# Patient Record
Sex: FEMALE | Race: WHITE | Employment: FULL TIME | URBAN - METROPOLITAN AREA
[De-identification: names, ages, dates, MRNs, and addresses within clinical notes are randomized per-mention and may not be internally consistent; named-entity substitution may affect disease eponyms.]

---

## 2018-07-09 ENCOUNTER — TELEPHONE (OUTPATIENT)
Dept: ADMINISTRATIVE | Age: 53
End: 2018-07-09

## 2018-07-09 ENCOUNTER — OFFICE VISIT (OUTPATIENT)
Dept: ENT CLINIC | Age: 53
End: 2018-07-09
Payer: COMMERCIAL

## 2018-07-09 VITALS
HEART RATE: 73 BPM | DIASTOLIC BLOOD PRESSURE: 82 MMHG | TEMPERATURE: 97.8 F | HEIGHT: 66 IN | WEIGHT: 146.8 LBS | BODY MASS INDEX: 23.59 KG/M2 | SYSTOLIC BLOOD PRESSURE: 121 MMHG

## 2018-07-09 DIAGNOSIS — H60.332 ACUTE SWIMMER'S EAR OF LEFT SIDE: Primary | ICD-10-CM

## 2018-07-09 PROCEDURE — G8420 CALC BMI NORM PARAMETERS: HCPCS | Performed by: PHYSICIAN ASSISTANT

## 2018-07-09 PROCEDURE — 99203 OFFICE O/P NEW LOW 30 MIN: CPT | Performed by: PHYSICIAN ASSISTANT

## 2018-07-09 PROCEDURE — G8427 DOCREV CUR MEDS BY ELIG CLIN: HCPCS | Performed by: PHYSICIAN ASSISTANT

## 2018-07-09 PROCEDURE — 4130F TOPICAL PREP RX AOE: CPT | Performed by: PHYSICIAN ASSISTANT

## 2018-07-09 PROCEDURE — 3017F COLORECTAL CA SCREEN DOC REV: CPT | Performed by: PHYSICIAN ASSISTANT

## 2018-07-09 PROCEDURE — 4004F PT TOBACCO SCREEN RCVD TLK: CPT | Performed by: PHYSICIAN ASSISTANT

## 2018-07-09 RX ORDER — AMOXICILLIN 500 MG/1
500 CAPSULE ORAL 3 TIMES DAILY
COMMUNITY
End: 2019-05-07 | Stop reason: ALTCHOICE

## 2018-07-09 RX ORDER — LOSARTAN POTASSIUM 50 MG/1
50 TABLET ORAL DAILY
COMMUNITY

## 2018-07-09 RX ORDER — AMOXICILLIN AND CLAVULANATE POTASSIUM 875; 125 MG/1; MG/1
1 TABLET, FILM COATED ORAL 2 TIMES DAILY
Qty: 20 TABLET | Refills: 0 | Status: SHIPPED | OUTPATIENT
Start: 2018-07-09 | End: 2018-07-19

## 2018-07-09 RX ORDER — OFLOXACIN 3 MG/ML
5 SOLUTION AURICULAR (OTIC) 2 TIMES DAILY
COMMUNITY
End: 2019-05-07

## 2018-07-09 RX ORDER — FLUCONAZOLE 150 MG/1
150 TABLET ORAL ONCE
Qty: 2 TABLET | Refills: 0 | Status: SHIPPED | OUTPATIENT
Start: 2018-07-09 | End: 2018-07-09

## 2018-07-09 RX ORDER — CIPROFLOXACIN AND DEXAMETHASONE 3; 1 MG/ML; MG/ML
4 SUSPENSION/ DROPS AURICULAR (OTIC) 2 TIMES DAILY
Qty: 1 BOTTLE | Refills: 0 | Status: SHIPPED | OUTPATIENT
Start: 2018-07-09 | End: 2018-07-16

## 2018-07-09 RX ORDER — METHYLPREDNISOLONE 4 MG/1
TABLET ORAL
Qty: 1 KIT | Refills: 0 | Status: SHIPPED | OUTPATIENT
Start: 2018-07-09 | End: 2018-07-15

## 2018-07-09 RX ORDER — SIMVASTATIN 10 MG
10 TABLET ORAL DAILY
COMMUNITY

## 2018-07-09 ASSESSMENT — ENCOUNTER SYMPTOMS
VOMITING: 0
STRIDOR: 0
SHORTNESS OF BREATH: 0
NAUSEA: 0
GASTROINTESTINAL NEGATIVE: 1
EYES NEGATIVE: 1
RHINORRHEA: 0

## 2018-07-09 NOTE — TELEPHONE ENCOUNTER
Patient called and made a new patient appt for 7/26/18 with Sybil Bazan, She states that she's been in to see her PCP Dr. aCleb Quesada and he cleaned her ears out and now feels like she water stuck in there or swimmers ear. She states that her left ear is painful and has swelling. She is calling Dr. Caleb Quesada again to have him look at it but she said that what he is doing isn't helping her. I have her on the wait list and if there is anyway she would be able to be seen sooner to please call her at [phone], thank you.

## 2018-07-09 NOTE — PROGRESS NOTES
Positive for environmental allergies. Neurological: Negative. Negative for dizziness and headaches. /82 (Site: Right Arm, Position: Sitting, Cuff Size: Medium Adult)   Pulse 73   Temp 97.8 °F (36.6 °C) (Oral)   Ht 5' 6\" (1.676 m)   Wt 146 lb 12.8 oz (66.6 kg)   BMI 23.69 kg/m²   Physical Exam   Constitutional: She appears well-developed and well-nourished. HENT:   Head: Normocephalic and atraumatic. Right Ear: Tympanic membrane, external ear and ear canal normal. Tympanic membrane is not erythematous. Left Ear: There is drainage, swelling and tenderness. No mastoid tenderness. Nose: Mucosal edema present. No rhinorrhea. Mouth/Throat: Uvula is midline, oropharynx is clear and moist and mucous membranes are normal.   Severe swelling of the canal unable to visualize TM    No mastoid erythema or tenderness,  Displacement of the pinna, pain with palpation of the tragus, mild edema over TMJ radiating down mandible. No abscess noted within oral cavity- no acute abnormalities of teeth or gums noted. Eyes: Conjunctivae and EOM are normal. Pupils are equal, round, and reactive to light. Neck: Normal range of motion. Neck supple. No tracheal deviation present. No thyromegaly present. Cardiovascular: Normal rate. Pulmonary/Chest: Effort normal. No respiratory distress. Musculoskeletal: Normal range of motion. Neurological: She is alert. No cranial nerve deficit. Skin: Skin is warm. No erythema. Psychiatric: She has a normal mood and affect. Her behavior is normal. Thought content normal.   Vitals reviewed. DIAGNOSIS:    ICD-10-CM ICD-9-CM    1.  Acute swimmer's ear of left side H60.332 380.12 ciprofloxacin-dexamethasone (CIPRODEX) 0.3-0.1 % otic suspension      methylPREDNISolone (MEDROL DOSEPACK) 4 MG tablet      amoxicillin-clavulanate (AUGMENTIN) 875-125 MG per tablet      fluconazole (DIFLUCAN) 150 MG tablet       IMPRESSION/PLAN:  Wick placed in the office

## 2018-07-16 ENCOUNTER — OFFICE VISIT (OUTPATIENT)
Dept: ENT CLINIC | Age: 53
End: 2018-07-16
Payer: COMMERCIAL

## 2018-07-16 VITALS
DIASTOLIC BLOOD PRESSURE: 80 MMHG | SYSTOLIC BLOOD PRESSURE: 117 MMHG | BODY MASS INDEX: 23.25 KG/M2 | WEIGHT: 144.7 LBS | HEIGHT: 66 IN | HEART RATE: 93 BPM

## 2018-07-16 DIAGNOSIS — H65.33 BILATERAL CHRONIC SECRETORY OTITIS MEDIA: Primary | ICD-10-CM

## 2018-07-16 DIAGNOSIS — H60.331 ACUTE SWIMMER'S EAR OF RIGHT SIDE: ICD-10-CM

## 2018-07-16 DIAGNOSIS — H65.91 FLUID LEVEL BEHIND TYMPANIC MEMBRANE OF RIGHT EAR: ICD-10-CM

## 2018-07-16 PROCEDURE — G8427 DOCREV CUR MEDS BY ELIG CLIN: HCPCS | Performed by: PHYSICIAN ASSISTANT

## 2018-07-16 PROCEDURE — 3017F COLORECTAL CA SCREEN DOC REV: CPT | Performed by: PHYSICIAN ASSISTANT

## 2018-07-16 PROCEDURE — 99213 OFFICE O/P EST LOW 20 MIN: CPT | Performed by: PHYSICIAN ASSISTANT

## 2018-07-16 PROCEDURE — G8420 CALC BMI NORM PARAMETERS: HCPCS | Performed by: PHYSICIAN ASSISTANT

## 2018-07-16 PROCEDURE — 4130F TOPICAL PREP RX AOE: CPT | Performed by: PHYSICIAN ASSISTANT

## 2018-07-16 PROCEDURE — 4004F PT TOBACCO SCREEN RCVD TLK: CPT | Performed by: PHYSICIAN ASSISTANT

## 2018-07-16 ASSESSMENT — ENCOUNTER SYMPTOMS
NAUSEA: 0
GASTROINTESTINAL NEGATIVE: 1
SHORTNESS OF BREATH: 0
EYES NEGATIVE: 1
RHINORRHEA: 0
VOMITING: 0
STRIDOR: 0

## 2018-07-16 NOTE — PROGRESS NOTES
[facility]      Patient Name:  Sandip Kirk  :  1965     CHIEF C/O:    Chief Complaint   Patient presents with    Follow-up     1 week follow up left ear wick patient states that left ear feels better  at times patient states that the right ear also closed up while out of town and used drops in right ear also patient states that both ears are feeling better but are  at times          HISTORY OBTAINED FROM:  patient    HISTORY OF PRESENT ILLNESS:       Evaristo Moy is a 46 y.o. female, Presents for recheck. Patient states that all symptoms are much improved. Jaw pain and swelling have resolved. Left ear is greatly improved- wick fell out the next day and patient continued to use ciprodex drops. Patient does state that on Wednesday she noted that right ear was having more pain and felt completely plugged and was swollen shut. Her and her son used a Q-tip as a wick and stuck in her ear along with Ciprodex drops. Patient will complete 7 days of Ciprodex drops in the left ear today, will continue for a full 7 days of the right ear. Patient states she has been using 4 drops twice a day. She additionally finish Medrol Dosepak, and is finishing Augmentin. Patient continues to note that she has 4-5 otitis media yearly for at least 5-6 years. Patient does have a history of smoking. States a number of ear infections of been the same for the last 5 years. She has never previously been assessed by ENT. Patient is continuing Flonase 2 sprays bilaterally daily initially was completing only one spray bilaterally for 1 week, since last week she has used 2 sprays bilaterally daily. Patient still notes some mild hearing loss of the right ear.     Past Medical History:   Diagnosis Date    Asthma     recent diagnosis    Drug effect     codiene    Hypertension     Hypoglycemia     Menopausal symptoms     night sweats    Stress incontinence      Past Surgical History: Procedure Laterality Date    HYSTERECTOMY      TVH    INDUCED       2 in past   [facility] TONSILLECTOMY  18yr old       Current Outpatient Prescriptions:     losartan (COZAAR) 50 MG tablet, Take 50 mg by mouth daily, Disp: , Rfl:     simvastatin (ZOCOR) 10 MG tablet, Take 10 mg by mouth nightly, Disp: , Rfl:     aspirin 81 MG tablet, Take 81 mg by mouth daily, Disp: , Rfl:     amoxicillin (AMOXIL) 500 MG capsule, Take 500 mg by mouth 3 times daily, Disp: , Rfl:     ofloxacin (FLOXIN) 0.3 % otic solution, 5 drops 2 times daily, Disp: , Rfl:     ciprofloxacin-dexamethasone (CIPRODEX) 0.3-0.1 % otic suspension, Place 4 drops into the left ear 2 times daily for 7 days, Disp: 1 Bottle, Rfl: 0    amoxicillin-clavulanate (AUGMENTIN) 875-125 MG per tablet, Take 1 tablet by mouth 2 times daily for 10 days, Disp: 20 tablet, Rfl: 0    ciprofloxacin (CIPRO) 500 MG tablet, Take 500 mg by mouth 2 times daily. , Disp: , Rfl:     valsartan (DIOVAN) 160 MG tablet, Take 160 mg by mouth daily. , Disp: , Rfl:     ibuprofen (ADVIL;MOTRIN) 200 MG tablet, Take 600 mg by mouth every morning. Indications: Backache, Disp: , Rfl:   Ace inhibitors; Lipitor [atorvastatin]; Sulfa antibiotics; Vicodin [hydrocodone-acetaminophen]; and Codeine  Social History   Substance Use Topics    Smoking status: Current Every Day Smoker     Packs/day: 1.00     Years: 27.00    Smokeless tobacco: Never Used    Alcohol use 14.4 oz/week     24 Cans of beer per week     Family History   Problem Relation Age of Onset    Cancer Father 61        lung CA    High Blood Pressure Mother     Cancer Maternal Aunt         breast CA    Cancer Maternal Grandmother     Cancer Paternal Grandmother         breast CA       Review of Systems   Constitutional: Negative. Negative for chills and fever. HENT: Positive for hearing loss, postnasal drip and tinnitus. Negative for congestion, ear discharge, ear pain and rhinorrhea. Eyes: Negative. Negative for visual disturbance. Respiratory: Negative for shortness of breath and stridor. Gastrointestinal: Negative. Negative for nausea and vomiting. Allergic/Immunologic: Positive for environmental allergies. Neurological: Negative. Negative for dizziness and headaches. All other systems reviewed and are negative. /80 (Site: Left Arm, Position: Sitting, Cuff Size: Medium Adult)   Pulse 93   Ht 5' 6\" (1.676 m)   Wt 144 lb 11.2 oz (65.6 kg)   BMI 23.36 kg/m²   Physical Exam   Constitutional: She appears well-developed and well-nourished. HENT:   Head: Normocephalic and atraumatic. Right Ear: External ear and ear canal normal. No drainage, swelling or tenderness. Tympanic membrane is not erythematous. A middle ear effusion is present. Left Ear: No drainage, swelling or tenderness. No mastoid tenderness. Nose: Mucosal edema present. No rhinorrhea. Mouth/Throat: Uvula is midline, oropharynx is clear and moist and mucous membranes are normal.   Air-fluid line noted in the right ear. Eyes: Conjunctivae and EOM are normal. Pupils are equal, round, and reactive to light. Neck: Normal range of motion. Neck supple. No tracheal deviation present. No thyromegaly present. Cardiovascular: Normal rate. Pulmonary/Chest: Effort normal. No respiratory distress. Musculoskeletal: Normal range of motion. Neurological: She is alert. No cranial nerve deficit. Skin: Skin is warm. No erythema. Psychiatric: She has a normal mood and affect. Her behavior is normal. Thought content normal.   Vitals reviewed. DIAGNOSIS:    ICD-10-CM ICD-9-CM    1. Bilateral chronic secretory otitis media H65.33 381.3    2. Fluid level behind tympanic membrane of right ear H65.91 381.4    3. Acute swimmer's ear of right side H60.331 380.12        IMPRESSION/PLAN:  Patient advised to finish Augmentin. Continues to Ciprodex drops for a full 7 days in the right ear.  Patient advised continue Flonase 2 sprays bilaterally daily due to middle ear effusion and history of chronic/ recurrent otitis media with effusion. Follow up in 2-3 weeks. At that time did discuss that we will likely complete the scope nasopharynx to assess for any instruction of eustachian tube. If no obstruction is present with patient's recurrent ear infections she may be a candidate for ear tubes or eustachian tube dilation procedure. We'll discuss further next visit. The plan was explained and patient is without any further questions. Follow up in 2-3 week(s), or if any new or worsening symptoms call the office to be seen sooner or report to the emergency department. This note was done using voice recognition software. There may be accidental errors in grammar or spelling.      BEN Marino

## 2018-08-06 ENCOUNTER — OFFICE VISIT (OUTPATIENT)
Dept: ENT CLINIC | Age: 53
End: 2018-08-06
Payer: COMMERCIAL

## 2018-08-06 ENCOUNTER — PROCEDURE VISIT (OUTPATIENT)
Dept: AUDIOLOGY | Age: 53
End: 2018-08-06
Payer: COMMERCIAL

## 2018-08-06 VITALS
WEIGHT: 144.4 LBS | HEART RATE: 68 BPM | HEIGHT: 66 IN | DIASTOLIC BLOOD PRESSURE: 79 MMHG | SYSTOLIC BLOOD PRESSURE: 116 MMHG | BODY MASS INDEX: 23.21 KG/M2

## 2018-08-06 DIAGNOSIS — H69.83 DYSFUNCTION OF BOTH EUSTACHIAN TUBES: ICD-10-CM

## 2018-08-06 DIAGNOSIS — J31.0 RHINITIS, CHRONIC: Primary | ICD-10-CM

## 2018-08-06 DIAGNOSIS — H93.8X3 EAR PRESSURE, BILATERAL: Primary | ICD-10-CM

## 2018-08-06 PROCEDURE — 4004F PT TOBACCO SCREEN RCVD TLK: CPT | Performed by: OTOLARYNGOLOGY

## 2018-08-06 PROCEDURE — 99214 OFFICE O/P EST MOD 30 MIN: CPT | Performed by: OTOLARYNGOLOGY

## 2018-08-06 PROCEDURE — G8427 DOCREV CUR MEDS BY ELIG CLIN: HCPCS | Performed by: OTOLARYNGOLOGY

## 2018-08-06 PROCEDURE — 3017F COLORECTAL CA SCREEN DOC REV: CPT | Performed by: OTOLARYNGOLOGY

## 2018-08-06 PROCEDURE — 92567 TYMPANOMETRY: CPT | Performed by: AUDIOLOGIST

## 2018-08-06 PROCEDURE — G8420 CALC BMI NORM PARAMETERS: HCPCS | Performed by: OTOLARYNGOLOGY

## 2018-08-06 RX ORDER — MONTELUKAST SODIUM 10 MG/1
10 TABLET ORAL DAILY
Qty: 30 TABLET | Refills: 3 | Status: SHIPPED | OUTPATIENT
Start: 2018-08-06 | End: 2018-11-06 | Stop reason: SDUPTHER

## 2018-08-06 RX ORDER — FLUTICASONE PROPIONATE 50 MCG
1 SPRAY, SUSPENSION (ML) NASAL DAILY
Refills: 0 | COMMUNITY
Start: 2018-07-06 | End: 2019-11-08 | Stop reason: SDUPTHER

## 2018-08-06 ASSESSMENT — ENCOUNTER SYMPTOMS
EYE REDNESS: 1
RHINORRHEA: 1
SINUS PRESSURE: 1
EYE PAIN: 1
EYE DISCHARGE: 0

## 2018-08-06 NOTE — PATIENT INSTRUCTIONS
Call us if you have any signs of sinus infection   When using the Prescription Nasal Spray use your right hand to spray into the left nostril and the left hand to spray into the right nostril. Do Not Sniff after spraying. This must be used daily to get improvement of symptoms which takes at least 2-3 weeks to see any results. May take up to six weeks to get the best improvement.

## 2018-08-13 RX ORDER — FLUTICASONE PROPIONATE 50 MCG
2 SPRAY, SUSPENSION (ML) NASAL DAILY
Qty: 1 BOTTLE | Refills: 2 | Status: SHIPPED | OUTPATIENT
Start: 2018-08-13 | End: 2018-11-06 | Stop reason: SDUPTHER

## 2018-09-13 ENCOUNTER — TELEPHONE (OUTPATIENT)
Dept: ENT CLINIC | Age: 53
End: 2018-09-13

## 2018-11-06 ENCOUNTER — OFFICE VISIT (OUTPATIENT)
Dept: ENT CLINIC | Age: 53
End: 2018-11-06
Payer: COMMERCIAL

## 2018-11-06 VITALS — HEIGHT: 66 IN | BODY MASS INDEX: 23.05 KG/M2 | WEIGHT: 143.4 LBS

## 2018-11-06 DIAGNOSIS — Z91.09 ENVIRONMENTAL ALLERGIES: Primary | ICD-10-CM

## 2018-11-06 PROCEDURE — 3017F COLORECTAL CA SCREEN DOC REV: CPT | Performed by: OTOLARYNGOLOGY

## 2018-11-06 PROCEDURE — 99213 OFFICE O/P EST LOW 20 MIN: CPT | Performed by: OTOLARYNGOLOGY

## 2018-11-06 PROCEDURE — G8484 FLU IMMUNIZE NO ADMIN: HCPCS | Performed by: OTOLARYNGOLOGY

## 2018-11-06 PROCEDURE — G8427 DOCREV CUR MEDS BY ELIG CLIN: HCPCS | Performed by: OTOLARYNGOLOGY

## 2018-11-06 PROCEDURE — G8420 CALC BMI NORM PARAMETERS: HCPCS | Performed by: OTOLARYNGOLOGY

## 2018-11-06 PROCEDURE — 4004F PT TOBACCO SCREEN RCVD TLK: CPT | Performed by: OTOLARYNGOLOGY

## 2018-11-06 RX ORDER — FLUTICASONE PROPIONATE 50 MCG
2 SPRAY, SUSPENSION (ML) NASAL DAILY
Qty: 1 BOTTLE | Refills: 2 | Status: SHIPPED | OUTPATIENT
Start: 2018-11-06

## 2018-11-06 RX ORDER — MONTELUKAST SODIUM 10 MG/1
10 TABLET ORAL DAILY
Qty: 30 TABLET | Refills: 3 | Status: SHIPPED | OUTPATIENT
Start: 2018-11-06 | End: 2019-05-07 | Stop reason: ALTCHOICE

## 2018-11-26 ASSESSMENT — ENCOUNTER SYMPTOMS
VOMITING: 0
COUGH: 0
SINUS PAIN: 0
SORE THROAT: 0
SHORTNESS OF BREATH: 0
RHINORRHEA: 0
VOICE CHANGE: 0

## 2018-11-26 NOTE — PROGRESS NOTES
[facility]  Dr. Adam Ann. Toni So. Ms.Ed        Patient Name:  Kari Zuluaga  :  1965     CHIEF C/O:    Chief Complaint   Patient presents with    Follow-up     3 mo f/u allergies/sinus-singulair seems to be working good; occasionally nose gets stuffy; needs singulair refills. HISTORY OBTAINED FROM:  patient    HISTORY OF PRESENT ILLNESS:       July Mcgowan is a 48y.o. year old female, here today for follow up of Sinus and nasal complaints with associated nasal congestion. Patient was placed on Singulair as well as point is feeling significantly improved with no acute exacerbation and tolerating current medical therapy wall. No positive nasal congestion and drainage no recent history of sinus infection or MR therapy no current complaints of sore throat fever chills to this vertigo or hearing loss.       Past Medical History:   Diagnosis Date    Asthma     recent diagnosis    Drug effect     codiene    Hypertension     Hypoglycemia 1992    Menopausal symptoms     night sweats    Stress incontinence      Past Surgical History:   Procedure Laterality Date    HYSTERECTOMY      TVH    INDUCED       2 in past   [employer] TONSILLECTOMY  18yr old       Current Outpatient Prescriptions:     fluticasone (FLONASE) 50 MCG/ACT nasal spray, 2 sprays by Nasal route daily 2 sprays each nostril once daily, Disp: 1 Bottle, Rfl: 2    montelukast (SINGULAIR) 10 MG tablet, Take 1 tablet by mouth daily, Disp: 30 tablet, Rfl: 3    fluticasone (FLONASE) 50 MCG/ACT nasal spray, 1 spray by Nasal route daily, Disp: , Rfl: 0    losartan (COZAAR) 50 MG tablet, Take 50 mg by mouth daily, Disp: , Rfl:     simvastatin (ZOCOR) 10 MG tablet, Take 10 mg by mouth nightly, Disp: , Rfl:     aspirin 81 MG tablet, Take 81 mg by mouth daily, Disp: , Rfl:     amoxicillin (AMOXIL) 500 MG capsule, Take 500 mg by mouth 3 times daily, Disp: , Rfl:     ofloxacin (FLOXIN) 0.3 % otic solution, 5 drops 2 times of motion. She exhibits no edema. Lymphadenopathy:     She has no cervical adenopathy. Neurological: She is alert. No cranial nerve deficit. Skin: Skin is warm. No erythema. Nursing note and vitals reviewed. IMPRESSION/PLAN:  Patient seen and examined for history of recurrent seasonal allergies, currently medically comminution of Flonase and Singulair. Patient will control continue current medical therapy, and follow up in 4-6 months or sooner with acute exacerbations of current complaints. Dr. Romulo Fish.  Otolaryngology Facial Plastic Surgery  :  [facility] Otolaryngology/Facial Plastic Surgery Residency  Associate Clinical Professor:  Attila Saez, [facility] [address]

## 2019-02-28 ENCOUNTER — TELEPHONE (OUTPATIENT)
Dept: ENT CLINIC | Age: 54
End: 2019-02-28

## 2019-02-28 RX ORDER — MONTELUKAST SODIUM 10 MG/1
10 TABLET ORAL NIGHTLY
Qty: 30 TABLET | Refills: 2 | Status: SHIPPED | OUTPATIENT
Start: 2019-02-28 | End: 2019-05-07 | Stop reason: SDUPTHER

## 2019-03-22 ENCOUNTER — HOSPITAL ENCOUNTER (OUTPATIENT)
Age: 54
Discharge: HOME | End: 2019-03-24
Payer: COMMERCIAL

## 2019-03-22 LAB
ALBUMIN SERPL-MCNC: 4.5 G/DL (ref 3.5–5.2)
ALP BLD-CCNC: 93 U/L (ref 35–104)
ALT SERPL-CCNC: 30 U/L (ref 0–32)
ANION GAP SERPL CALCULATED.3IONS-SCNC: 13 MMOL/L (ref 7–16)
AST SERPL-CCNC: 35 U/L (ref 0–31)
BILIRUB SERPL-MCNC: 0.3 MG/DL (ref 0–1.2)
BUN BLDV-MCNC: 10 MG/DL (ref 6–20)
CALCIUM SERPL-MCNC: 9.3 MG/DL (ref 8.6–10.2)
CHLORIDE BLD-SCNC: 102 MMOL/L (ref 98–107)
CHOLESTEROL, TOTAL: 171 MG/DL (ref 0–199)
CO2: 27 MMOL/L (ref 22–29)
CREAT SERPL-MCNC: 0.8 MG/DL (ref 0.5–1)
GFR AFRICAN AMERICAN: >60
GFR NON-AFRICAN AMERICAN: >60 ML/MIN/1.73
GLUCOSE BLD-MCNC: 88 MG/DL (ref 74–99)
HCT VFR BLD CALC: 43.6 % (ref 34–48)
HDLC SERPL-MCNC: 60 MG/DL
HEMOGLOBIN: 14.5 G/DL (ref 11.5–15.5)
LDL CHOLESTEROL CALCULATED: 79 MG/DL (ref 0–99)
MCH RBC QN AUTO: 33.6 PG (ref 26–35)
MCHC RBC AUTO-ENTMCNC: 33.3 % (ref 32–34.5)
MCV RBC AUTO: 100.9 FL (ref 80–99.9)
PDW BLD-RTO: 12.9 FL (ref 11.5–15)
PLATELET # BLD: 238 E9/L (ref 130–450)
PMV BLD AUTO: 12.2 FL (ref 7–12)
POTASSIUM SERPL-SCNC: 4.8 MMOL/L (ref 3.5–5)
RBC # BLD: 4.32 E12/L (ref 3.5–5.5)
SODIUM BLD-SCNC: 142 MMOL/L (ref 132–146)
TOTAL PROTEIN: 7.9 G/DL (ref 6.4–8.3)
TRIGL SERPL-MCNC: 159 MG/DL (ref 0–149)
TSH SERPL DL<=0.05 MIU/L-ACNC: 2.58 UIU/ML (ref 0.27–4.2)
VLDLC SERPL CALC-MCNC: 32 MG/DL
WBC # BLD: 8.6 E9/L (ref 4.5–11.5)

## 2019-03-22 PROCEDURE — 80061 LIPID PANEL: CPT

## 2019-03-22 PROCEDURE — 80053 COMPREHEN METABOLIC PANEL: CPT

## 2019-03-22 PROCEDURE — 84443 ASSAY THYROID STIM HORMONE: CPT

## 2019-03-22 PROCEDURE — 85027 COMPLETE CBC AUTOMATED: CPT

## 2019-05-07 ENCOUNTER — OFFICE VISIT (OUTPATIENT)
Dept: ENT CLINIC | Age: 54
End: 2019-05-07
Payer: COMMERCIAL

## 2019-05-07 VITALS
HEART RATE: 80 BPM | DIASTOLIC BLOOD PRESSURE: 72 MMHG | HEIGHT: 66 IN | SYSTOLIC BLOOD PRESSURE: 104 MMHG | BODY MASS INDEX: 23.46 KG/M2 | WEIGHT: 146 LBS

## 2019-05-07 DIAGNOSIS — Z91.09 ENVIRONMENTAL ALLERGIES: Primary | ICD-10-CM

## 2019-05-07 DIAGNOSIS — J31.0 RHINITIS, CHRONIC: ICD-10-CM

## 2019-05-07 DIAGNOSIS — H69.83 DYSFUNCTION OF BOTH EUSTACHIAN TUBES: ICD-10-CM

## 2019-05-07 PROCEDURE — 4004F PT TOBACCO SCREEN RCVD TLK: CPT | Performed by: OTOLARYNGOLOGY

## 2019-05-07 PROCEDURE — 3017F COLORECTAL CA SCREEN DOC REV: CPT | Performed by: OTOLARYNGOLOGY

## 2019-05-07 PROCEDURE — 99213 OFFICE O/P EST LOW 20 MIN: CPT | Performed by: OTOLARYNGOLOGY

## 2019-05-07 PROCEDURE — G8427 DOCREV CUR MEDS BY ELIG CLIN: HCPCS | Performed by: OTOLARYNGOLOGY

## 2019-05-07 PROCEDURE — G8420 CALC BMI NORM PARAMETERS: HCPCS | Performed by: OTOLARYNGOLOGY

## 2019-05-07 RX ORDER — MONTELUKAST SODIUM 10 MG/1
10 TABLET ORAL
Qty: 30 TABLET | Refills: 5 | Status: SHIPPED
Start: 2019-05-07 | End: 2020-06-02

## 2019-05-07 ASSESSMENT — ENCOUNTER SYMPTOMS
COUGH: 0
VOMITING: 0
VOICE CHANGE: 0
SHORTNESS OF BREATH: 0
SINUS PAIN: 0
SORE THROAT: 0
RHINORRHEA: 0

## 2019-05-07 NOTE — PROGRESS NOTES
[facility]  Dr. Shannan Osorio. Willi Ray D.O. Ms.Ed        Patient Name:  Ever Silva  :  1965     CHIEF C/O:    Chief Complaint   Patient presents with    6 Month Follow-Up     pt states doing good and no new issues        HISTORY OBTAINED FROM:  patient    HISTORY OF PRESENT ILLNESS:       Leesa Savage is a 48y.o. year old female, here today for follow up of Sinus and nasal complaints with associated nasal congestion. Patient has been on Singulair as well as flonase and is doing well and tolerating current medical therapy wall. No positive nasal congestion and drainage no recent history of sinus infection or no current complaints of sore throat fever chills to this vertigo or hearing loss. Past Medical History:   Diagnosis Date    Asthma     recent diagnosis    Drug effect     codiene    Hypertension     Hypoglycemia     Menopausal symptoms     night sweats    Stress incontinence      Past Surgical History:   Procedure Laterality Date    HYSTERECTOMY      TVH    INDUCED       2 in past   [facility] TONSILLECTOMY  18yr old       Current Outpatient Medications:     montelukast (SINGULAIR) 10 MG tablet, Take 1 tablet by mouth every morning (before breakfast), Disp: 30 tablet, Rfl: 5    fluticasone (FLONASE) 50 MCG/ACT nasal spray, 2 sprays by Nasal route daily 2 sprays each nostril once daily, Disp: 1 Bottle, Rfl: 2    fluticasone (FLONASE) 50 MCG/ACT nasal spray, 1 spray by Nasal route daily, Disp: , Rfl: 0    losartan (COZAAR) 50 MG tablet, Take 50 mg by mouth daily, Disp: , Rfl:     simvastatin (ZOCOR) 10 MG tablet, Take 10 mg by mouth daily , Disp: , Rfl:     aspirin 81 MG tablet, Take 81 mg by mouth daily, Disp: , Rfl:     valsartan (DIOVAN) 160 MG tablet, Take 160 mg by mouth daily. , Disp: , Rfl:     ibuprofen (ADVIL;MOTRIN) 200 MG tablet, Take 600 mg by mouth every morning. Indications: Backache, Disp: , Rfl:   Ace inhibitors;  Lipitor [atorvastatin]; Sulfa reviewed. IMPRESSION/PLAN:  Patient seen and examined for history of recurrent seasonal allergies, currently medically comminution of Flonase and Singulair. Refills provided. She may use allegra or claritin as needed. Patient will control continue current medical therapy, and follow up in 4-6 months or sooner with acute exacerbations of current complaints. Patient seen, examined, and plan discussed with Dr. Shayy Galaviz    Electronically signed by Naga Naranjo DO on 5/7/2019 at 11:12 AM            Rajan Araya  1965      I have discussed the case, including pertinent history and exam findings with the resident. I have seen and examined the patient and the key elements of the encounter have been performed by me. I agree with the assessment, plan and orders as documented by the resident. Patient here for follow up of medical problems. Remainder of medical problems as per resident note.       [address] [facility],   5/23/19

## 2019-11-08 ENCOUNTER — OFFICE VISIT (OUTPATIENT)
Dept: ENT CLINIC | Age: 54
End: 2019-11-08
Payer: COMMERCIAL

## 2019-11-08 VITALS
DIASTOLIC BLOOD PRESSURE: 72 MMHG | BODY MASS INDEX: 23.95 KG/M2 | WEIGHT: 149 LBS | SYSTOLIC BLOOD PRESSURE: 120 MMHG | HEART RATE: 70 BPM | HEIGHT: 66 IN

## 2019-11-08 DIAGNOSIS — J31.0 RHINITIS, CHRONIC: ICD-10-CM

## 2019-11-08 DIAGNOSIS — R05.3 CHRONIC COUGH: Primary | ICD-10-CM

## 2019-11-08 PROCEDURE — 3017F COLORECTAL CA SCREEN DOC REV: CPT | Performed by: OTOLARYNGOLOGY

## 2019-11-08 PROCEDURE — 99213 OFFICE O/P EST LOW 20 MIN: CPT | Performed by: OTOLARYNGOLOGY

## 2019-11-08 PROCEDURE — G8420 CALC BMI NORM PARAMETERS: HCPCS | Performed by: OTOLARYNGOLOGY

## 2019-11-08 PROCEDURE — G8427 DOCREV CUR MEDS BY ELIG CLIN: HCPCS | Performed by: OTOLARYNGOLOGY

## 2019-11-08 PROCEDURE — 4004F PT TOBACCO SCREEN RCVD TLK: CPT | Performed by: OTOLARYNGOLOGY

## 2019-11-08 PROCEDURE — G8484 FLU IMMUNIZE NO ADMIN: HCPCS | Performed by: OTOLARYNGOLOGY

## 2019-11-08 RX ORDER — FLUTICASONE PROPIONATE 50 MCG
2 SPRAY, SUSPENSION (ML) NASAL DAILY
Qty: 1 BOTTLE | Refills: 3 | Status: SHIPPED
Start: 2019-11-08 | End: 2021-10-19

## 2019-11-14 ASSESSMENT — ENCOUNTER SYMPTOMS
COUGH: 0
VOMITING: 0
SHORTNESS OF BREATH: 0

## 2020-05-08 ENCOUNTER — OFFICE VISIT (OUTPATIENT)
Dept: ENT CLINIC | Age: 55
End: 2020-05-08
Payer: COMMERCIAL

## 2020-05-08 VITALS — HEIGHT: 66 IN | BODY MASS INDEX: 23.46 KG/M2 | WEIGHT: 146 LBS

## 2020-05-08 PROCEDURE — 4004F PT TOBACCO SCREEN RCVD TLK: CPT | Performed by: OTOLARYNGOLOGY

## 2020-05-08 PROCEDURE — G8427 DOCREV CUR MEDS BY ELIG CLIN: HCPCS | Performed by: OTOLARYNGOLOGY

## 2020-05-08 PROCEDURE — 3017F COLORECTAL CA SCREEN DOC REV: CPT | Performed by: OTOLARYNGOLOGY

## 2020-05-08 PROCEDURE — G8420 CALC BMI NORM PARAMETERS: HCPCS | Performed by: OTOLARYNGOLOGY

## 2020-05-08 PROCEDURE — 99213 OFFICE O/P EST LOW 20 MIN: CPT | Performed by: OTOLARYNGOLOGY

## 2020-05-08 RX ORDER — FLUTICASONE PROPIONATE 50 MCG
2 SPRAY, SUSPENSION (ML) NASAL DAILY
Qty: 1 BOTTLE | Refills: 5 | Status: SHIPPED
Start: 2020-05-08 | End: 2021-10-19

## 2020-05-08 ASSESSMENT — ENCOUNTER SYMPTOMS
COUGH: 0
SHORTNESS OF BREATH: 0
VOMITING: 0

## 2020-05-08 NOTE — PROGRESS NOTES
Take 600 mg by mouth every morning. Indications: Backache, Disp: , Rfl:   Ace inhibitors; Fluticasone; Lipitor [atorvastatin]; Sulfa antibiotics; Vicodin [hydrocodone-acetaminophen]; and Codeine  Social History     Tobacco Use    Smoking status: Current Every Day Smoker     Packs/day: 1.00     Years: 27.00     Pack years: 27.00    Smokeless tobacco: Never Used   Substance Use Topics    Alcohol use: Yes     Alcohol/week: 24.0 standard drinks     Types: 24 Cans of beer per week    Drug use: No     Family History   Problem Relation Age of Onset    Cancer Father 61        lung CA    High Blood Pressure Mother     Cancer Maternal Aunt         breast CA    Cancer Maternal Grandmother     Cancer Paternal Grandmother         breast CA       Review of Systems   Constitutional: Negative for chills and fever. HENT: Negative for ear discharge and hearing loss. Respiratory: Negative for cough and shortness of breath. Cardiovascular: Negative for chest pain and palpitations. Gastrointestinal: Negative for vomiting. Skin: Negative for rash. Allergic/Immunologic: Negative for environmental allergies. Neurological: Negative for dizziness and headaches. Hematological: Does not bruise/bleed easily. All other systems reviewed and are negative. Ht 5' 6\" (1.676 m)   Wt 146 lb (66.2 kg)   BMI 23.57 kg/m²   Physical Exam  Vitals signs and nursing note reviewed. Constitutional:       Appearance: She is well-developed. HENT:      Head: Normocephalic. Right Ear: Tympanic membrane, ear canal and external ear normal.      Left Ear: Tympanic membrane, ear canal and external ear normal.      Nose: Mucosal edema, congestion and rhinorrhea present. Rhinorrhea is clear. Mouth/Throat:      Lips: Pink. Mouth: Mucous membranes are moist.      Pharynx: Oropharynx is clear. Uvula midline. Eyes:      Pupils: Pupils are equal, round, and reactive to light.    Neck:      Musculoskeletal: Normal range of motion. Thyroid: No thyromegaly. Trachea: No tracheal deviation. Cardiovascular:      Rate and Rhythm: Normal rate. Pulmonary:      Effort: Pulmonary effort is normal. No respiratory distress. Musculoskeletal: Normal range of motion. Lymphadenopathy:      Cervical: No cervical adenopathy. Skin:     General: Skin is warm. Findings: No erythema. Neurological:      Mental Status: She is alert. Cranial Nerves: No cranial nerve deficit. IMPRESSION/PLAN:    Patient seen exam for history of chronic allergic rhinitis and postnasal drainage with congestion. We will continue nasal steroids as she is improving overall from a otolaryngology standpoint. While she looks congested today she states she feels fine and does not want to take further medications at this time to help with congestion. Flonase refilled. Follow up in 6 months              Nkechi Olson  1965      I have discussed the case, including pertinent history and exam findings with the resident. I have seen and examined the patient and the key elements of the encounter have been performed by me. I agree with the assessment, plan and orders as documented by the resident. Patient here for follow up of medical problems. Remainder of medical problems as per resident note.       [facility], DO  5/11/20

## 2020-06-01 ENCOUNTER — TELEPHONE (OUTPATIENT)
Dept: ENT CLINIC | Age: 55
End: 2020-06-01

## 2020-06-02 RX ORDER — MONTELUKAST SODIUM 10 MG/1
TABLET ORAL
Qty: 90 TABLET | Refills: 2 | Status: SHIPPED
Start: 2020-06-02 | End: 2021-03-15

## 2020-11-11 ENCOUNTER — OFFICE VISIT (OUTPATIENT)
Dept: ENT CLINIC | Age: 55
End: 2020-11-11
Payer: COMMERCIAL

## 2020-11-11 VITALS — TEMPERATURE: 98.2 F | HEIGHT: 66 IN | WEIGHT: 148 LBS | BODY MASS INDEX: 23.78 KG/M2

## 2020-11-11 PROCEDURE — 99213 OFFICE O/P EST LOW 20 MIN: CPT | Performed by: OTOLARYNGOLOGY

## 2020-11-11 PROCEDURE — G8420 CALC BMI NORM PARAMETERS: HCPCS | Performed by: OTOLARYNGOLOGY

## 2020-11-11 PROCEDURE — 3017F COLORECTAL CA SCREEN DOC REV: CPT | Performed by: OTOLARYNGOLOGY

## 2020-11-11 PROCEDURE — 4004F PT TOBACCO SCREEN RCVD TLK: CPT | Performed by: OTOLARYNGOLOGY

## 2020-11-11 PROCEDURE — G8427 DOCREV CUR MEDS BY ELIG CLIN: HCPCS | Performed by: OTOLARYNGOLOGY

## 2020-11-11 PROCEDURE — G8484 FLU IMMUNIZE NO ADMIN: HCPCS | Performed by: OTOLARYNGOLOGY

## 2020-11-11 RX ORDER — AZELASTINE 1 MG/ML
2 SPRAY, METERED NASAL 2 TIMES DAILY
Qty: 1 BOTTLE | Refills: 1 | Status: SHIPPED
Start: 2020-11-11 | End: 2021-10-19 | Stop reason: SDUPTHER

## 2020-11-11 NOTE — PROGRESS NOTES
[facility]aryngology  Dr. Doris Vila. Claire Dinero. Ms.Ed        Patient Name:  Garwin Goodell  :  1965     CHIEF C/O:    Chief Complaint   Patient presents with    Allergies     6 month f/u allergies       HISTORY OBTAINED FROM:  patient    HISTORY OF PRESENT ILLNESS:       Eloisa Fregoso is a 54y.o. year old female, here today for follow up of known history of significant nasal congestion facial pressure postnasal drainage, currently taking Flonase and singular, with moderate response but continues to have frequent mucosal drainage, ear fullness and pressure left greater than right. No current complaints of epistaxis, she is having facial pain without focal headaches. No complaints of changes in voice shortness of breath or stridor, no recent history of imaging studies, patient has had antibiotics x2 since her last appointment here for sinus infection. Past Medical History:   Diagnosis Date    Asthma     recent diagnosis    Drug effect     codiene    Hypertension     Hypoglycemia     Menopausal symptoms     night sweats    Stress incontinence      Past Surgical History:   Procedure Laterality Date    HYSTERECTOMY      TVH    INDUCED       2 in past   [facility] TONSILLECTOMY  18yr old       Current Outpatient Medications:     azelastine (ASTELIN) 0.1 % nasal spray, 2 sprays by Nasal route 2 times daily Use in each nostril as directed, Disp: 1 Bottle, Rfl: 1    montelukast (SINGULAIR) 10 MG tablet, TAKE 1 TABLET EVERY MORNINGBEFORE BREAKFAST, Disp: 90 tablet, Rfl: 2    fluticasone (FLONASE) 50 MCG/ACT nasal spray, 2 sprays by Nasal route daily Use in both nostrils. , Disp: 1 Bottle, Rfl: 5    fluticasone (FLONASE) 50 MCG/ACT nasal spray, 2 sprays by Nasal route daily 2 sprays each nostril once daily, Disp: 1 Bottle, Rfl: 3    fluticasone (FLONASE) 50 MCG/ACT nasal spray, 2 sprays by Nasal route daily 2 sprays each nostril once daily, Disp: 1 Bottle, Rfl: 2    losartan (COZAAR) 50 MG tablet, Take 50 mg by mouth daily, Disp: , Rfl:     simvastatin (ZOCOR) 10 MG tablet, Take 10 mg by mouth daily , Disp: , Rfl:     aspirin 81 MG tablet, Take 81 mg by mouth daily, Disp: , Rfl:     valsartan (DIOVAN) 160 MG tablet, Take 160 mg by mouth daily. , Disp: , Rfl:     ibuprofen (ADVIL;MOTRIN) 200 MG tablet, Take 600 mg by mouth every morning. Indications: Backache, Disp: , Rfl:   Ace inhibitors; Fluticasone; Lipitor [atorvastatin]; Sulfa antibiotics; Vicodin [hydrocodone-acetaminophen]; and Codeine  Social History     Tobacco Use    Smoking status: Current Every Day Smoker     Packs/day: 1.00     Years: 27.00     Pack years: 27.00    Smokeless tobacco: Never Used   Substance Use Topics    Alcohol use: Yes     Alcohol/week: 24.0 standard drinks     Types: 24 Cans of beer per week    Drug use: No     Family History   Problem Relation Age of Onset    Cancer Father 61        lung CA    High Blood Pressure Mother     Cancer Maternal Aunt         breast CA    Cancer Maternal Grandmother     Cancer Paternal Grandmother         breast CA       Review of Systems   Constitutional: Negative for chills and fever. HENT: Positive for congestion. Negative for ear discharge, hearing loss, postnasal drip, sinus pressure, sore throat and tinnitus. Respiratory: Negative for cough and shortness of breath. Cardiovascular: Negative for chest pain and palpitations. Gastrointestinal: Negative for vomiting. Skin: Negative for rash. Allergic/Immunologic: Negative for environmental allergies. Neurological: Negative for dizziness and headaches. Hematological: Does not bruise/bleed easily. All other systems reviewed and are negative. Temp 98.2 °F (36.8 °C)   Ht 5' 6\" (1.676 m)   Wt 148 lb (67.1 kg)   BMI 23.89 kg/m²   Physical Exam  Vitals signs and nursing note reviewed. Constitutional:       Appearance: She is well-developed. HENT:      Head: Normocephalic and atraumatic.       Right Ear: Tympanic membrane and ear canal normal.      Left Ear: Tympanic membrane and ear canal normal.      Nose: Congestion and rhinorrhea present. Mouth/Throat:      Mouth: Mucous membranes are dry. Pharynx: Oropharynx is clear. No oropharyngeal exudate or posterior oropharyngeal erythema. Eyes:      Pupils: Pupils are equal, round, and reactive to light. Neck:      Musculoskeletal: Normal range of motion and neck supple. Thyroid: No thyromegaly. Trachea: No tracheal deviation. Cardiovascular:      Rate and Rhythm: Normal rate. Pulmonary:      Effort: Pulmonary effort is normal. No respiratory distress. Musculoskeletal: Normal range of motion. General: No tenderness. Skin:     General: Skin is warm and dry. Neurological:      Mental Status: She is alert. Cranial Nerves: No cranial nerve deficit. IMPRESSION/PLAN:  Patient is seen and examined for history of chronic sinus disease is not currently being controlled with full medical therapy, will add daily Astelin as well as Flonase Singulair and she will undergo a CT scan to rule out any obstructive pathology nasal polyposis or chronic sinus infection. Dr. Tim Her.  Otolaryngology Facial Plastic Surgery  :  [facility] Otolaryngology/Facial Plastic Surgery Residency  Associate Clinical Professor:  Jane Ellis, [facility]

## 2020-11-25 ASSESSMENT — ENCOUNTER SYMPTOMS
VOMITING: 0
SHORTNESS OF BREATH: 0
SINUS PRESSURE: 0
SORE THROAT: 0
COUGH: 0

## 2020-12-09 ENCOUNTER — OFFICE VISIT (OUTPATIENT)
Dept: ENT CLINIC | Age: 55
End: 2020-12-09
Payer: COMMERCIAL

## 2020-12-09 VITALS — HEIGHT: 66 IN | WEIGHT: 151 LBS | BODY MASS INDEX: 24.27 KG/M2

## 2020-12-09 PROCEDURE — 99213 OFFICE O/P EST LOW 20 MIN: CPT | Performed by: OTOLARYNGOLOGY

## 2020-12-09 PROCEDURE — G8420 CALC BMI NORM PARAMETERS: HCPCS | Performed by: OTOLARYNGOLOGY

## 2020-12-09 PROCEDURE — G8427 DOCREV CUR MEDS BY ELIG CLIN: HCPCS | Performed by: OTOLARYNGOLOGY

## 2020-12-09 PROCEDURE — 4004F PT TOBACCO SCREEN RCVD TLK: CPT | Performed by: OTOLARYNGOLOGY

## 2020-12-09 PROCEDURE — 3017F COLORECTAL CA SCREEN DOC REV: CPT | Performed by: OTOLARYNGOLOGY

## 2020-12-09 PROCEDURE — G8484 FLU IMMUNIZE NO ADMIN: HCPCS | Performed by: OTOLARYNGOLOGY

## 2020-12-09 NOTE — PROGRESS NOTES
[facility] Otolaryngology  Dr. Maureen Lopez. Karen Pat. Ms.Ed      Patient Name:  Carmen Finley  :  1965     CHIEF C/O:    Chief Complaint   Patient presents with    Results     ct scan results sinus          HISTORY OBTAINED FROM:  patient    HISTORY OF PRESENT ILLNESS:       Tod Martin is a 54y.o. year old female, here today for follow up of known history of significant nasal congestion facial pressure postnasal drainage, currently taking Flonase (did not tolerate astelin spray d/t headace) and singular, with moderate response but continues to have frequent mucosal drainage, ear fullness and pressure left greater than right. No current complaints of epistaxis. No complaints of changes in voice shortness of breath or stridor, CT scan reviewed, no sinonasal disease or anatomical deviation pertinent to her symptoms. patient has had antibiotics x2 since May to no improvement of her symptoms. She is also complaining today of burning eyes, dry mouth, and dry nose. She does not use any type of nasal saline or humidifier at the moment. Past Medical History:   Diagnosis Date    Asthma     recent diagnosis    Drug effect     codiene    Hypertension     Hypoglycemia     Menopausal symptoms     night sweats    Stress incontinence      Past Surgical History:   Procedure Laterality Date    HYSTERECTOMY      TVH    INDUCED       2 in past   Mae Bryant TONSILLECTOMY  18yr old       Current Outpatient Medications:     azelastine (ASTELIN) 0.1 % nasal spray, 2 sprays by Nasal route 2 times daily Use in each nostril as directed, Disp: 1 Bottle, Rfl: 1    montelukast (SINGULAIR) 10 MG tablet, TAKE 1 TABLET EVERY MORNINGBEFORE BREAKFAST, Disp: 90 tablet, Rfl: 2    fluticasone (FLONASE) 50 MCG/ACT nasal spray, 2 sprays by Nasal route daily Use in both nostrils. , Disp: 1 Bottle, Rfl: 5    fluticasone (FLONASE) 50 MCG/ACT nasal spray, 2 sprays by Nasal route daily 2 sprays each nostril once daily, Disp: 1 Bottle, Rfl: 3    fluticasone (FLONASE) 50 MCG/ACT nasal spray, 2 sprays by Nasal route daily 2 sprays each nostril once daily, Disp: 1 Bottle, Rfl: 2    losartan (COZAAR) 50 MG tablet, Take 50 mg by mouth daily, Disp: , Rfl:     simvastatin (ZOCOR) 10 MG tablet, Take 10 mg by mouth daily , Disp: , Rfl:     aspirin 81 MG tablet, Take 81 mg by mouth daily, Disp: , Rfl:     valsartan (DIOVAN) 160 MG tablet, Take 160 mg by mouth daily. , Disp: , Rfl:     ibuprofen (ADVIL;MOTRIN) 200 MG tablet, Take 600 mg by mouth every morning. Indications: Backache, Disp: , Rfl:   Ace inhibitors; Fluticasone; Lipitor [atorvastatin]; Sulfa antibiotics; Vicodin [hydrocodone-acetaminophen]; and Codeine  Social History     Tobacco Use    Smoking status: Current Every Day Smoker     Packs/day: 1.00     Years: 27.00     Pack years: 27.00    Smokeless tobacco: Never Used   Substance Use Topics    Alcohol use: Yes     Alcohol/week: 24.0 standard drinks     Types: 24 Cans of beer per week    Drug use: No     Family History   Problem Relation Age of Onset    Cancer Father 61        lung CA    High Blood Pressure Mother     Cancer Maternal Aunt         breast CA    Cancer Maternal Grandmother     Cancer Paternal Grandmother         breast CA       Review of Systems   Constitutional: Negative for chills and fever. HENT: Dry eyes Positive for congestion. Negative for ear discharge, hearing loss, postnasal drip, sinus pressure, sore throat and tinnitus. Respiratory: Negative for cough and shortness of breath. Cardiovascular: Negative for chest pain and palpitations. Gastrointestinal: Negative for vomiting. Skin: Negative for rash. Allergic/Immunologic: Negative for environmental allergies. Neurological: Negative for dizziness and headaches. Hematological: Does not bruise/bleed easily. Endocrine: Polydypsia, dry mouth  All other systems reviewed and are negative.       Ht 5' 6\" (1.676 m)   Wt 151 lb (68.5 kg)

## 2020-12-10 ENCOUNTER — TELEPHONE (OUTPATIENT)
Dept: ENT CLINIC | Age: 55
End: 2020-12-10

## 2020-12-10 NOTE — TELEPHONE ENCOUNTER
Checked via [employer] regarding coverage for allergy testing. reference number [id number] . Patient has been an active member since 3/1/19 . Services are covered at 80% after deductible has been met. The in-network deductible is $ 350 which $ 350 has been currently satisfied. The maximum out of pocket is $ 6600 which $ 608.81 has been currently satisfied. Coverage for procedure codes are as follows: 77355: is  a covered benefit, no prior auth, limitations- none 54570: is  a covered benefit, no prior auth, limitations- none 79067: is  a covered benefit, no prior auth, limitations- none 46712: is  a covered benefit, no prior auth, limitations- none 07906: is  a covered benefit, no prior auth, limitations- none. Provider is in network.       Lm notifying pt of coverage and advised to come to [facility] for labs

## 2021-01-25 DIAGNOSIS — J31.0 RHINITIS, CHRONIC: ICD-10-CM

## 2021-01-25 DIAGNOSIS — H04.129 CHRONICALLY DRY EYES, UNSPECIFIED LATERALITY: ICD-10-CM

## 2021-01-25 LAB — HBA1C MFR BLD: 5.6 % (ref 4–5.6)

## 2021-01-26 LAB
ENA TO SSA (RO) ANTIBODY: NEGATIVE
ENA TO SSB (LA) ANTIBODY: POSITIVE

## 2021-02-01 LAB
Lab: NORMAL
REPORT: NORMAL
THIS TEST SENT TO: NORMAL

## 2021-02-02 ENCOUNTER — OFFICE VISIT (OUTPATIENT)
Dept: ENT CLINIC | Age: 56
End: 2021-02-02

## 2021-02-02 VITALS — BODY MASS INDEX: 24.11 KG/M2 | HEIGHT: 66 IN | TEMPERATURE: 97 F | WEIGHT: 150 LBS

## 2021-02-02 DIAGNOSIS — H04.129 CHRONICALLY DRY EYES, UNSPECIFIED LATERALITY: ICD-10-CM

## 2021-02-02 DIAGNOSIS — M35.00 SJOGREN'S SYNDROME, WITH UNSPECIFIED ORGAN INVOLVEMENT (HCC): Primary | ICD-10-CM

## 2021-02-02 DIAGNOSIS — J31.0 RHINITIS, CHRONIC: ICD-10-CM

## 2021-02-02 PROCEDURE — 99213 OFFICE O/P EST LOW 20 MIN: CPT | Performed by: OTOLARYNGOLOGY

## 2021-02-02 NOTE — PROGRESS NOTES
[facility]  Dr. Felicia Schneider. Ruy Goins D.O. Ms.Ed      Patient Name:  Dannielle Ziegler  :  1965     CHIEF C/O:    Chief Complaint   Patient presents with    Results     patient here for allery test results       HISTORY OBTAINED FROM:  patient    HISTORY OF PRESENT ILLNESS:       Evaristo Moy is a 54y.o. year old female, here today for follow up of known history of significant nasal congestion facial pressure postnasal drainage, currently taking Flonase (did not tolerate astelin spray d/t headace) and singular and claritin with moderate response but continues to have frequent mucosal drainage, ear fullness and pressure left greater than right. She presents today for check of allergy testing and issues with previous and persistent symptoms. She has persistent dry eyes, polydipsia, fatigue. No current complaints of epistaxis. No complaints of changes in voice shortness of breath or stridor, CT scan reviewed previously, no sinonasal disease or anatomical deviation pertinent to her symptoms. Past Medical History:   Diagnosis Date    Asthma     recent diagnosis    Drug effect     codiene    Hypertension     Hypoglycemia     Menopausal symptoms     night sweats    Stress incontinence      Past Surgical History:   Procedure Laterality Date    HYSTERECTOMY      TVH    INDUCED       2 in past   Shari Mons TONSILLECTOMY  18yr old       Current Outpatient Medications:     azelastine (ASTELIN) 0.1 % nasal spray, 2 sprays by Nasal route 2 times daily Use in each nostril as directed, Disp: 1 Bottle, Rfl: 1    montelukast (SINGULAIR) 10 MG tablet, TAKE 1 TABLET EVERY MORNINGBEFORE BREAKFAST, Disp: 90 tablet, Rfl: 2    fluticasone (FLONASE) 50 MCG/ACT nasal spray, 2 sprays by Nasal route daily Use in both nostrils. , Disp: 1 Bottle, Rfl: 5    fluticasone (FLONASE) 50 MCG/ACT nasal spray, 2 sprays by Nasal route daily 2 sprays each nostril once daily, Disp: 1 Bottle, Rfl: 3    fluticasone (FLONASE) 50 MCG/ACT nasal spray, 2 sprays by Nasal route daily 2 sprays each nostril once daily, Disp: 1 Bottle, Rfl: 2    losartan (COZAAR) 50 MG tablet, Take 50 mg by mouth daily, Disp: , Rfl:     simvastatin (ZOCOR) 10 MG tablet, Take 10 mg by mouth daily , Disp: , Rfl:     aspirin 81 MG tablet, Take 81 mg by mouth daily, Disp: , Rfl:     valsartan (DIOVAN) 160 MG tablet, Take 160 mg by mouth daily. , Disp: , Rfl:     ibuprofen (ADVIL;MOTRIN) 200 MG tablet, Take 600 mg by mouth every morning. Indications: Backache, Disp: , Rfl:   Ace inhibitors, Fluticasone, Lipitor [atorvastatin], Sulfa antibiotics, Vicodin [hydrocodone-acetaminophen], and Codeine  Social History     Tobacco Use    Smoking status: Current Every Day Smoker     Packs/day: 1.00     Years: 27.00     Pack years: 27.00    Smokeless tobacco: Never Used   Substance Use Topics    Alcohol use: Yes     Alcohol/week: 24.0 standard drinks     Types: 24 Cans of beer per week    Drug use: No     Family History   Problem Relation Age of Onset    Cancer Father 61        lung CA    High Blood Pressure Mother     Cancer Maternal Aunt         breast CA    Cancer Maternal Grandmother     Cancer Paternal Grandmother         breast CA       Review of Systems   Constitutional: Negative for chills and fever. HENT: Dry eyes Positive for congestion. Negative for ear discharge, hearing loss, postnasal drip, sinus pressure, sore throat and tinnitus. Respiratory: Negative for cough and shortness of breath. Cardiovascular: Negative for chest pain and palpitations. Gastrointestinal: Negative for vomiting. Skin: Negative for rash. Allergic/Immunologic: Negative for environmental allergies. Neurological: Negative for dizziness and headaches. Hematological: Does not bruise/bleed easily. Endocrine: Polydypsia, dry mouth  All other systems reviewed and are negative.       Temp 97 °F (36.1 °C)   Ht 5' 6\" (1.676 m)   Wt 150 lb (68 kg)   BMI 24.21 kg/m²   Physical Exam  Vitals signs and nursing note reviewed. Constitutional:       Appearance: She is well-developed. HENT:      Head: Normocephalic and atraumatic. Right Ear: Tympanic membrane and ear canal normal.      Left Ear: Tympanic membrane and ear canal normal.      Nose: Congestion and rhinorrhea present. Mouth/Throat:      Mouth: Mucous membranes are dry. Pharynx: Oropharynx is clear. No oropharyngeal exudate or posterior oropharyngeal erythema. Eyes:      Pupils: Pupils are equal, round, and reactive to light. Neck:      Musculoskeletal: Normal range of motion and neck supple. Thyroid: No thyromegaly. Trachea: No tracheal deviation. Cardiovascular:      Rate and Rhythm: Normal rate. Pulmonary:      Effort: Pulmonary effort is normal. No respiratory distress. Musculoskeletal: Normal range of motion. General: No tenderness. Skin:     General: Skin is warm and dry. Neurological:      Mental Status: She is alert. Cranial Nerves: No cranial nerve deficit. IMPRESSION/PLAN:    Patient is seen and examined for history of chronic sinus disease is not currently being controlled with full medical therapy. Allergy testing negative today and reviewed with patient . 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 We will refer to rheumatology for further work up and management. Continue with flonase, singulair and claritin.

## 2021-03-13 ENCOUNTER — TELEPHONE (OUTPATIENT)
Dept: ENT CLINIC | Age: 56
End: 2021-03-13

## 2021-03-15 RX ORDER — MONTELUKAST SODIUM 10 MG/1
TABLET ORAL
Qty: 90 TABLET | Refills: 2 | Status: SHIPPED
Start: 2021-03-15 | End: 2021-10-11 | Stop reason: SDUPTHER

## 2021-06-04 ENCOUNTER — TELEPHONE (OUTPATIENT)
Dept: ENT CLINIC | Age: 56
End: 2021-06-04

## 2021-06-04 NOTE — TELEPHONE ENCOUNTER
received fax from office regarding pt no-showed 2x and office will not reschedule for Referral.    Please Advise

## 2021-10-11 ENCOUNTER — OFFICE VISIT (OUTPATIENT)
Dept: ENT CLINIC | Age: 56
End: 2021-10-11

## 2021-10-11 VITALS — BODY MASS INDEX: 24.11 KG/M2 | WEIGHT: 150 LBS | HEIGHT: 66 IN

## 2021-10-11 DIAGNOSIS — H60.393 OTHER INFECTIVE OTITIS EXTERNA OF BOTH EARS, UNSPECIFIED CHRONICITY: Primary | ICD-10-CM

## 2021-10-11 PROCEDURE — 99213 OFFICE O/P EST LOW 20 MIN: CPT | Performed by: NURSE PRACTITIONER

## 2021-10-11 RX ORDER — CIPROFLOXACIN AND DEXAMETHASONE 3; 1 MG/ML; MG/ML
4 SUSPENSION/ DROPS AURICULAR (OTIC) 2 TIMES DAILY
Qty: 7.5 ML | Refills: 1 | Status: SHIPPED | OUTPATIENT
Start: 2021-10-11 | End: 2021-10-18

## 2021-10-11 RX ORDER — MONTELUKAST SODIUM 10 MG/1
10 TABLET ORAL NIGHTLY
Qty: 90 TABLET | Refills: 1 | Status: SHIPPED | OUTPATIENT
Start: 2021-10-11 | End: 2022-01-09

## 2021-10-11 RX ORDER — METHYLPREDNISOLONE 4 MG/1
4 TABLET ORAL SEE ADMIN INSTRUCTIONS
Qty: 1 KIT | Refills: 0 | Status: SHIPPED | OUTPATIENT
Start: 2021-10-11 | End: 2021-10-17

## 2021-10-11 NOTE — PROGRESS NOTES
[facility] Otolaryngology  Dr. Tiffanie Ott. DAVID Collins Ms.Ed. New Consult       Patient Name:  Navid Cronin  :  1965     CHIEF C/O:    Chief Complaint   Patient presents with    Ear Problem     patient states she has an ear infection in both ears, they are swollen, a lot of pain. actively draining       HISTORY OBTAINED FROM:  patient    HISTORY OF PRESENT ILLNESS:       Rosa Mendoza is a 54y.o. year old female, here today for swelling drainage from ears. Symptoms began with pain in the right ear 5 days ago  Was seen by PCP and placed on augmentin  Right ear feels swollen shut with swelling and drainage from the left  Symptoms have worsened while on antibiotics  Started after getting water in ears at hair dressers  Hx of sinus and inner ear infections, not normally external ear infections  Denies fevers  Hearing is muffled  No previous surgeries on sinuses or ears        Past Medical History:   Diagnosis Date    Asthma     recent diagnosis    Drug effect     codiene    Hypertension     Hypoglycemia 1992    Menopausal symptoms     night sweats    Stress incontinence      Past Surgical History:   Procedure Laterality Date    HYSTERECTOMY      TVH    INDUCED       2 in past    TONSILLECTOMY  18yr old       Current Outpatient Medications:     montelukast (SINGULAIR) 10 MG tablet, TAKE 1 TABLET EVERY MORNINGBEFORE BREAKFAST, Disp: 90 tablet, Rfl: 2    azelastine (ASTELIN) 0.1 % nasal spray, 2 sprays by Nasal route 2 times daily Use in each nostril as directed, Disp: 1 Bottle, Rfl: 1    fluticasone (FLONASE) 50 MCG/ACT nasal spray, 2 sprays by Nasal route daily Use in both nostrils. , Disp: 1 Bottle, Rfl: 5    fluticasone (FLONASE) 50 MCG/ACT nasal spray, 2 sprays by Nasal route daily 2 sprays each nostril once daily, Disp: 1 Bottle, Rfl: 3    fluticasone (FLONASE) 50 MCG/ACT nasal spray, 2 sprays by Nasal route daily 2 sprays each nostril once daily, Disp: 1 Bottle, Rfl: 2   losartan (COZAAR) 50 MG tablet, Take 50 mg by mouth daily, Disp: , Rfl:     simvastatin (ZOCOR) 10 MG tablet, Take 10 mg by mouth daily , Disp: , Rfl:     aspirin 81 MG tablet, Take 81 mg by mouth daily, Disp: , Rfl:     valsartan (DIOVAN) 160 MG tablet, Take 160 mg by mouth daily. , Disp: , Rfl:     ibuprofen (ADVIL;MOTRIN) 200 MG tablet, Take 600 mg by mouth every morning. Indications: Backache, Disp: , Rfl:   Ace inhibitors, Fluticasone, Lipitor [atorvastatin], Sulfa antibiotics, Vicodin [hydrocodone-acetaminophen], and Codeine  Social History     Tobacco Use    Smoking status: Current Every Day Smoker     Packs/day: 1.00     Years: 27.00     Pack years: 27.00    Smokeless tobacco: Never Used   Substance Use Topics    Alcohol use: Yes     Alcohol/week: 24.0 standard drinks     Types: 24 Cans of beer per week    Drug use: No     Family History   Problem Relation Age of Onset    Cancer Father 61        lung CA    High Blood Pressure Mother     Cancer Maternal Aunt         breast CA    Cancer Maternal Grandmother     Cancer Paternal Grandmother         breast CA       Review of Systems   Constitutional: Negative. Negative for activity change and appetite change. HENT: Positive for ear discharge, ear pain and hearing loss. Eyes: Negative. Respiratory: Negative. Negative for shortness of breath and stridor. Cardiovascular: Negative. Negative for chest pain and palpitations. Endocrine: Negative. Musculoskeletal: Negative. Skin: Negative. Neurological: Negative. Negative for dizziness. Hematological: Negative. Psychiatric/Behavioral: Negative. Ht 5' 6\" (1.676 m)   Wt 150 lb (68 kg)   BMI 24.21 kg/m²   Physical Exam  Constitutional:       Appearance: Normal appearance. HENT:      Head: Normocephalic. Right Ear: External ear normal. Drainage, swelling and tenderness present. Left Ear: External ear normal. Drainage, swelling and tenderness present. Nose: No rhinorrhea. Right Turbinates: Not pale. Left Turbinates: Not pale. Mouth/Throat:      Lips: Pink. Mouth: Mucous membranes are moist.   Eyes:      Conjunctiva/sclera: Conjunctivae normal.      Pupils: Pupils are equal, round, and reactive to light. Cardiovascular:      Rate and Rhythm: Normal rate and regular rhythm. Pulses: Normal pulses. Pulmonary:      Effort: Pulmonary effort is normal. No respiratory distress. Breath sounds: No stridor. Musculoskeletal:         General: Normal range of motion. Cervical back: Normal range of motion. No rigidity. No muscular tenderness. Skin:     General: Skin is warm and dry. Neurological:      General: No focal deficit present. Mental Status: She is alert and oriented to person, place, and time. Psychiatric:         Mood and Affect: Mood normal.         Behavior: Behavior normal.         Thought Content: Thought content normal.         Judgment: Judgment normal.         IMPRESSION/PLAN:    Charlene Melvin was seen today for ear problem. Diagnoses and all orders for this visit:    Other infective otitis externa of both ears, unspecified chronicity    Other orders  -     ciprofloxacin-dexamethasone (CIPRODEX) 0.3-0.1 % otic suspension; Place 4 drops into both ears 2 times daily for 7 days  -     methylPREDNISolone (MEDROL DOSEPACK) 4 MG tablet; Take 1 tablet by mouth See Admin Instructions for 6 days Take by mouth. -     montelukast (SINGULAIR) 10 MG tablet; Take 1 tablet by mouth nightly      Patient exhibits significant swelling of bilateral ear canals with purulent drainage. The right ear canal is swollen inhibiting exam of the TM. An ear wick is placed within the right ear canal.  Patient will be placed on Ciprodex drops, 4 drops to both ears twice daily for 7 days. She is also placed on a Medrol dose pack. She will follow up in 1 month.   She is instructed to call with any new or worsening symptoms prior to her next appointment.         Cristal Hall, FABY, FNP-C  [facility], Nose and Throat    The information contained in this note has been dictated using drug and medical speech recognition software and may contain errors

## 2021-10-19 ENCOUNTER — OFFICE VISIT (OUTPATIENT)
Dept: ENT CLINIC | Age: 56
End: 2021-10-19

## 2021-10-19 ENCOUNTER — PROCEDURE VISIT (OUTPATIENT)
Dept: AUDIOLOGY | Age: 56
End: 2021-10-19

## 2021-10-19 VITALS — HEIGHT: 66 IN | BODY MASS INDEX: 24.11 KG/M2 | WEIGHT: 150 LBS

## 2021-10-19 DIAGNOSIS — H93.8X1 EAR FULLNESS, RIGHT: Primary | ICD-10-CM

## 2021-10-19 DIAGNOSIS — R09.82 POST-NASAL DRAINAGE: ICD-10-CM

## 2021-10-19 DIAGNOSIS — H93.8X3 SENSATION OF FULLNESS IN BOTH EARS: ICD-10-CM

## 2021-10-19 DIAGNOSIS — H60.393 OTHER INFECTIVE OTITIS EXTERNA OF BOTH EARS, UNSPECIFIED CHRONICITY: ICD-10-CM

## 2021-10-19 DIAGNOSIS — J34.89 NASAL DRYNESS: ICD-10-CM

## 2021-10-19 PROCEDURE — 99213 OFFICE O/P EST LOW 20 MIN: CPT | Performed by: NURSE PRACTITIONER

## 2021-10-19 PROCEDURE — 92567 TYMPANOMETRY: CPT | Performed by: AUDIOLOGIST

## 2021-10-19 PROCEDURE — 92557 COMPREHENSIVE HEARING TEST: CPT | Performed by: AUDIOLOGIST

## 2021-10-19 RX ORDER — AZELASTINE 1 MG/ML
2 SPRAY, METERED NASAL 2 TIMES DAILY
Qty: 30 ML | Refills: 2 | Status: SHIPPED
Start: 2021-10-19 | End: 2021-10-19 | Stop reason: SINTOL

## 2021-10-19 NOTE — PROGRESS NOTES
[facility] Otolaryngology  Dr. Cachorro Castillo. Diane Berumen. Ms.Ed        Patient Name:  Leesa Sheridan  :  1965     CHIEF C/O:    Chief Complaint   Patient presents with    Follow-up     ear wick. sinus pressure and congestions        HISTORY OBTAINED FROM:  patient    HISTORY OF PRESENT ILLNESS:       Isidoro Cloud is a 54y.o. year old female, here today for follow up of otitis externa, sinus pressure and congestion. Last seen 1 week ago  Ear wick placed in right ear  Started on ciprodex and medrol dose maddie  States swelling, pain and drainage has resolved  Now ears are popping and cracking  Feel full  Sinus congestion with rhinorrhea or post nasal drainage  Thick secretions when blowing nose  Has been given flonase and astelin in the past, but not currently  Does take claritin daily. No fevers  Hearing muffled and echoing bilaterally. Past Medical History:   Diagnosis Date    Asthma     recent diagnosis    Drug effect     codiene    Hypertension     Hypoglycemia     Menopausal symptoms     night sweats    Stress incontinence      Past Surgical History:   Procedure Laterality Date    HYSTERECTOMY      TVH    INDUCED       2 in past   [place] TONSILLECTOMY  18yr old       Current Outpatient Medications:     montelukast (SINGULAIR) 10 MG tablet, Take 1 tablet by mouth nightly, Disp: 90 tablet, Rfl: 1    azelastine (ASTELIN) 0.1 % nasal spray, 2 sprays by Nasal route 2 times daily Use in each nostril as directed, Disp: 1 Bottle, Rfl: 1    fluticasone (FLONASE) 50 MCG/ACT nasal spray, 2 sprays by Nasal route daily Use in both nostrils. , Disp: 1 Bottle, Rfl: 5    fluticasone (FLONASE) 50 MCG/ACT nasal spray, 2 sprays by Nasal route daily 2 sprays each nostril once daily, Disp: 1 Bottle, Rfl: 3    fluticasone (FLONASE) 50 MCG/ACT nasal spray, 2 sprays by Nasal route daily 2 sprays each nostril once daily, Disp: 1 Bottle, Rfl: 2    losartan (COZAAR) 50 MG tablet, Take 50 mg by mouth daily, Disp: , Rfl:     simvastatin (ZOCOR) 10 MG tablet, Take 10 mg by mouth daily , Disp: , Rfl:     aspirin 81 MG tablet, Take 81 mg by mouth daily, Disp: , Rfl:     valsartan (DIOVAN) 160 MG tablet, Take 160 mg by mouth daily. , Disp: , Rfl:     ibuprofen (ADVIL;MOTRIN) 200 MG tablet, Take 600 mg by mouth every morning. Indications: Backache, Disp: , Rfl:   Ace inhibitors, Fluticasone, Lipitor [atorvastatin], Sulfa antibiotics, Vicodin [hydrocodone-acetaminophen], and Codeine  Social History     Tobacco Use    Smoking status: Current Every Day Smoker     Packs/day: 1.00     Years: 27.00     Pack years: 27.00    Smokeless tobacco: Never Used   Substance Use Topics    Alcohol use: Yes     Alcohol/week: 24.0 standard drinks     Types: 24 Cans of beer per week    Drug use: No     Family History   Problem Relation Age of Onset    Cancer Father 61        lung CA    High Blood Pressure Mother     Cancer Maternal Aunt         breast CA    Cancer Maternal Grandmother     Cancer Paternal Grandmother         breast CA       Review of Systems   Constitutional: Negative. Negative for activity change and appetite change. HENT: Positive for congestion, hearing loss (Muffled hearing), postnasal drip, rhinorrhea and sinus pressure. Negative for ear discharge and ear pain. Eyes: Negative. Respiratory: Negative. Negative for shortness of breath and stridor. Cardiovascular: Negative. Negative for chest pain and palpitations. Endocrine: Negative. Musculoskeletal: Negative. Skin: Negative. Neurological: Negative. Negative for dizziness. Hematological: Negative. Psychiatric/Behavioral: Negative. Ht 5' 6\" (1.676 m)   Wt 150 lb (68 kg)   BMI 24.21 kg/m²   Physical Exam  Constitutional:       Appearance: Normal appearance. HENT:      Head: Normocephalic.       Right Ear: Tympanic membrane, ear canal and external ear normal.      Left Ear: Tympanic membrane, ear canal and external ear normal.      Nose: No rhinorrhea. Right Turbinates: Not pale. Left Turbinates: Not pale. Mouth/Throat:      Lips: Pink. Mouth: Mucous membranes are moist.      Pharynx: Oropharynx is clear. Eyes:      Conjunctiva/sclera: Conjunctivae normal.      Pupils: Pupils are equal, round, and reactive to light. Cardiovascular:      Rate and Rhythm: Normal rate and regular rhythm. Pulses: Normal pulses. Pulmonary:      Effort: Pulmonary effort is normal. No respiratory distress. Breath sounds: No stridor. Musculoskeletal:         General: Normal range of motion. Cervical back: Normal range of motion. No rigidity. No muscular tenderness. Skin:     General: Skin is warm and dry. Neurological:      General: No focal deficit present. Mental Status: She is alert and oriented to person, place, and time. Psychiatric:         Mood and Affect: Mood normal.         Behavior: Behavior normal.         Thought Content: Thought content normal.         Judgment: Judgment normal.               Audiogram and tympanogram reviewed with patient. Audiogram reveals 10 dB hearing loss in the right ear with 100% discrimination at 50 dB, 10decibels of hearing loss in the left ear with 100% discrimination at 50 dB. Tympanogram reveals type As curve in the right ear, with type As curve in the left ear. IMPRESSION/PLAN:    Charlene Melvin was seen today for follow-up.     Diagnoses and all orders for this visit:    Ear fullness, right  -     [facility] - SOFYA Brito, Audiology, [place]    Other infective otitis externa of both ears, unspecified chronicity    Nasal dryness    Post-nasal drainage    Other orders  -     Discontinue: azelastine (ASTELIN) 0.1 % nasal spray; 2 sprays by Nasal route 2 times daily Use in each nostril as directed      No further drainage or swelling from either ear canal.  Bilateral TMs are normal in appearance without sign of middle ear effusion or retraction. Patient will resume Flonase, 2 sprays each nostril once daily while starting nasal saline 3-4 times daily for hydration of her sinuses. She will follow up in 6 weeks for reevaluation. She is instructed to call with any new or worsening symptoms prior to her next appointment.       Christen Ibanez, MSN, FNP-C  [address] [place] [address], Nose and Throat    The information contained in this note has been dictated using drug and medical speech recognition software and may contain errors

## 2021-10-19 NOTE — PROGRESS NOTES
This patient was referred for audiometric/tympanometric testing by VANNESA Alcantara due to bilateral ear fullness. Patient did have an ear wick placed, right ear, one week ago. 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 Audiometry revealed normal hearing sensitivity, right ear and a mild dip, at 4000 and 8000Hz, left ear. Reliability was good. Speech reception thresholds were in good agreement with the pure tone averages, bilaterally. Speech discrimination scores were 100%, bilaterally at 50dBHL. Tympanometry revealed normal middle ear peak pressure and compliance, bilaterally. Ipsilateral acoustic reflexes were present, right ear and absent, left ear at 1000Hz. The results were reviewed with the patient. Recommendations for follow up will be made pending physician consult.     Huy Overton CCC/MADELIN  Audiologist  [id number]  NPI#:  [id number]

## 2021-10-21 ASSESSMENT — ENCOUNTER SYMPTOMS
RESPIRATORY NEGATIVE: 1
STRIDOR: 0
SHORTNESS OF BREATH: 0
EYES NEGATIVE: 1

## 2021-10-28 ASSESSMENT — ENCOUNTER SYMPTOMS
STRIDOR: 0
RHINORRHEA: 1
RESPIRATORY NEGATIVE: 1
EYES NEGATIVE: 1
SINUS PRESSURE: 1
SHORTNESS OF BREATH: 0

## 2022-01-20 LAB
HBV SURFACE AB TITR SER: NORMAL {TITER}
HEPATITIS B SURFACE ANTIGEN INTERPRETATION: NORMAL

## 2025-04-22 LAB
ALBUMIN SERPL-MCNC: 4.4 G/DL (ref 3.5–5.2)
ALP SERPL-CCNC: 97 U/L (ref 35–104)
ALT SERPL-CCNC: 27 U/L (ref 0–35)
ANION GAP SERPL CALCULATED.3IONS-SCNC: 9 MMOL/L (ref 7–16)
AST SERPL-CCNC: 43 U/L (ref 0–35)
BILIRUB SERPL-MCNC: 0.3 MG/DL (ref 0–1.2)
BUN SERPL-MCNC: 10 MG/DL (ref 6–20)
CALCIUM SERPL-MCNC: 9.8 MG/DL (ref 8.6–10)
CHLORIDE SERPL-SCNC: 102 MMOL/L (ref 98–107)
CHOLEST SERPL-MCNC: 186 MG/DL
CO2 SERPL-SCNC: 25 MMOL/L (ref 22–29)
CREAT SERPL-MCNC: 0.7 MG/DL (ref 0.5–1)
GFR, ESTIMATED: >90 ML/MIN/1.73M2
GLUCOSE SERPL-MCNC: 89 MG/DL (ref 74–99)
HDLC SERPL-MCNC: 69 MG/DL
LDLC SERPL CALC-MCNC: 95 MG/DL
POTASSIUM SERPL-SCNC: 5.6 MMOL/L (ref 3.5–5.1)
PROT SERPL-MCNC: 7.7 G/DL (ref 6.4–8.3)
SODIUM SERPL-SCNC: 137 MMOL/L (ref 136–145)
TRIGL SERPL-MCNC: 111 MG/DL
VLDLC SERPL CALC-MCNC: 22 MG/DL